# Patient Record
Sex: MALE | Race: OTHER | URBAN - METROPOLITAN AREA
[De-identification: names, ages, dates, MRNs, and addresses within clinical notes are randomized per-mention and may not be internally consistent; named-entity substitution may affect disease eponyms.]

---

## 2018-11-28 ENCOUNTER — OFFICE VISIT (OUTPATIENT)
Dept: FAMILY MEDICINE CLINIC | Age: 33
End: 2018-11-28

## 2018-11-28 VITALS
HEART RATE: 66 BPM | DIASTOLIC BLOOD PRESSURE: 70 MMHG | SYSTOLIC BLOOD PRESSURE: 135 MMHG | TEMPERATURE: 98.2 F | WEIGHT: 315 LBS | HEIGHT: 66 IN | BODY MASS INDEX: 50.62 KG/M2

## 2018-11-28 DIAGNOSIS — E66.01 MORBID OBESITY (HCC): ICD-10-CM

## 2018-11-28 DIAGNOSIS — Z23 ENCOUNTER FOR IMMUNIZATION: ICD-10-CM

## 2018-11-28 DIAGNOSIS — M25.552 LEFT HIP PAIN: Primary | ICD-10-CM

## 2018-11-28 RX ORDER — DICLOFENAC SODIUM 75 MG/1
75 TABLET, DELAYED RELEASE ORAL 2 TIMES DAILY
Qty: 60 TAB | Refills: 6 | Status: SHIPPED | OUTPATIENT
Start: 2018-11-28 | End: 2022-02-22

## 2018-11-28 NOTE — PROGRESS NOTES
Requests flu vaccine. Denies fever and egg allergy. VIS information sheet given to patient. Explained possible s/e. Reviewed s/sx indicating need to be seen in ER. Patient had no adverse reaction at time of discharge. Entered into VIIS. Vaccine(s) given per protocol and schedule by CORY Gutierrez. Enter into CC by Colonel Yadi LPN.

## 2018-11-28 NOTE — PROGRESS NOTES
HISTORY OF PRESENT ILLNESS Sam Rodriguez is a 26 y.o. male. HPI Is having left hip pain,  It has started 2 months ago and is on and off. Unaware of how it started. States that the cold makes it worse. Has taken some advil for the pain. Today is not having the pain. If he drives for a long time he gets the pain Review of Systems Constitutional: Negative for chills and fever. Respiratory: Negative for cough, shortness of breath and wheezing. Cardiovascular: Negative for chest pain and palpitations. Gastrointestinal: Negative for abdominal pain, heartburn, nausea and vomiting. Genitourinary: Negative for dysuria. Musculoskeletal: Positive for joint pain. Left hip pain Psychiatric/Behavioral: Negative for depression. /70 (BP 1 Location: Right arm, BP Patient Position: Sitting)   Pulse 66   Temp 98.2 °F (36.8 °C) (Oral)   Ht 5' 5.95\" (1.675 m)   Wt 316 lb (143.3 kg)   BMI 51.09 kg/m² Physical Exam  
Constitutional: He is oriented to person, place, and time. He appears well-developed and well-nourished. HENT:  
Head: Normocephalic. Right Ear: External ear normal.  
Left Ear: External ear normal.  
Eyes: Conjunctivae and EOM are normal. Pupils are equal, round, and reactive to light. Neck: Normal range of motion. Neck supple. No thyromegaly present. Cardiovascular: Normal rate, regular rhythm and normal heart sounds. No murmur heard. Pulmonary/Chest: Effort normal and breath sounds normal. No respiratory distress. He has no wheezes. He has no rales. Abdominal: Soft. Bowel sounds are normal. He exhibits no distension. Musculoskeletal: Normal range of motion. He exhibits no edema, tenderness or deformity. Neurological: He is alert and oriented to person, place, and time. He has normal reflexes. Skin: Skin is warm. No erythema. ASSESSMENT and PLAN Diagnoses and all orders for this visit: 1. Left hip pain -     diclofenac EC (VOLTAREN) 75 mg EC tablet; Take 1 Tab by mouth two (2) times a day. -     XR HIP LT W OR WO PELV 2-3 VWS; Future 2. Morbid obesity ([address] Left hip pain,  On and off,  Patient has gained over 50 pounds in the past 2 years. This is likely aggravating the hip pain. Will order x rays and nsaid. Exercises discussed. Weight loss encouraged

## 2018-11-28 NOTE — PATIENT INSTRUCTIONS
Dolor de cadera: Instrucciones de cuidado - [ Hip Pain: Care Instructions ] Instrucciones de cuidado El dolor de cadera podría tener muchas causas, incluidas el uso [place] caída o un movimiento de giro. La artritis es otra causa del dolor de cadera. El dolor podría aumentar cuando se pone de pie, camina o se pone en cuclillas. El dolor podría aparecer y desaparecer o ser isela. El tratamiento en el [facility] puede ayudar a aliviar el dolor, la hinchazón y la rigidez. Si el dolor continúa, selina vez necesite más pruebas y [place]. La atención de seguimiento es gillian parte clave de quesada tratamiento y seguridad. Asegúrese de hacer y acudir a todas las citas, y llame a quesada médico si está teniendo problemas. También es gillian buena idea saber los resultados de tariq exámenes y mantener gillian lista de los medicamentos que michael. Cómo puede cuidarse en el [facility]? · [employer] analgésicos (medicamentos para el dolor) exactamente jonel le fueron indicados. ? Si el médico le recetó un analgésico, tómelo según las indicaciones. ? Si no está tomando un analgésico recetado, pregúntele a quesada médico si puede iveth un medicamento de [employer]. · Descanse y proteja la cadera. Deje de hacer cualquier actividad, incluyendo estar de pie o caminar, que podría causarle dolor. · Aplíquese hielo o gillian compresa fría en la cadera enid 10 a 20 minutos cada vez. Trate de hacerlo cada 1 a 2 horas enid los siguientes 3 días (cuando esté despierto) o hasta que la hinchazón baje. Póngase un paño naylor entre el hielo y la piel. · Duerma del lado lauro con gillian almohada entre las rodillas, o duerma boca arriba con almohadas debajo de las rodillas. · Si no tiene hinchazón, puede aplicar calor húmedo, gillian almohadilla térmica o un paño tibio sobre la cadera. Ghassan ejercicios suaves de estiramiento para ayudar a mantener la cadera flexible. · Aprenda cómo prevenir las caídas.  Hágase revisiones regulares de la vista y los oídos. Use pantuflas o zapatos con suela antideslizante. · Mantenga un peso saludable. · Use zapatos cómodos. Cuándo debe pedir ayuda? Llame al 911 en cualquier momento que considere que necesita atención de emergencia. Por ejemplo, llame si: 
  · Tiene dolor repentino en el pecho y falta de aire, o tose malathi.  
  · No puede ponerse de pie, caminar o apoyar el peso del cuerpo.  
  · Siente entumecimiento u hormigueo en los glúteos (nalgas), las piernas o los pies.  
  · La pierna o el pie está frío o pálido o cambia de color.  
  · Tiene dolor intenso.  
 Llame a quesada médico ahora mismo o busque atención médica inmediata si: 
  · Tiene señales de infección, tales jonel: ? Mayor dolor, hinchazón, enrojecimiento o aumento de la temperatura en la tl de la cadera. ? Vetas rojizas que comienzan en la tl de la cadera. ? Pus que supura de la tl de la cadera. ? Arlander Og.  
  · Tiene señales de un coágulo de malathi, tales jonel: ? Dolor en la pantorrilla, el muslo, la adrian o detrás de la rodilla. ? Enrojecimiento e hinchazón en la pierna o la adrian.  
  · No puede flexionar, estirar o  la pierna de manera normal.  
  · Tiene problemas para orinar o evacuar el intestino.  
 Preste especial atención a los cambios en quesada sophie y asegúrese de comunicarse con quesada médico si: 
  · No mejora jonel se esperaba. Dónde puede encontrar más información en inglés? Vianca Stade a http://dana.info/. Kim Ruiz E029 en la búsqueda para aprender más acerca de \"Dolor de cadera: Instrucciones de cuidado - [ Hip Pain: Care Instructions ]. \" 
Revisado: 20 noviembre, 2017 Versión del contenido: 11.8 © 3478-4815 Healthwise, [employer]. Las instrucciones de cuidado fueron adaptadas bajo licencia por [employer] (which disclaims liability or warranty for this information).  Si usted tiene [place] afección médica o sobre estas instrucciones, siempre pregunte a quesada profesional de sophie. [facility] niega toda garantía o responsabilidad por quesada uso de esta información.

## 2018-11-28 NOTE — PROGRESS NOTES
Reviewed AVS, prescription and pharmacy location with patient. AVS and goodrx coupon printed and given. Walking x-ray process explained and address where to get x-ray given. Care card/financial assistance process explained. Instructed patient that after he gets the x-ray the provider will be able to see the result of the image and if abnormal he will receive a phone call with the result. This has been fully explained to the patient, who indicates understanding and agrees with plan. No further questions at this time.  Austin Chu RN

## 2019-09-25 ENCOUNTER — OFFICE VISIT (OUTPATIENT)
Dept: FAMILY MEDICINE CLINIC | Age: 34
End: 2019-09-25

## 2019-09-25 ENCOUNTER — HOSPITAL ENCOUNTER (OUTPATIENT)
Dept: LAB | Age: 34
Discharge: HOME | End: 2019-09-25

## 2019-09-25 VITALS
BODY MASS INDEX: 49.44 KG/M2 | SYSTOLIC BLOOD PRESSURE: 135 MMHG | HEART RATE: 66 BPM | OXYGEN SATURATION: 100 % | TEMPERATURE: 97.7 F | WEIGHT: 315 LBS | DIASTOLIC BLOOD PRESSURE: 79 MMHG | HEIGHT: 67 IN

## 2019-09-25 DIAGNOSIS — G47.30 SLEEP APNEA, UNSPECIFIED TYPE: Primary | ICD-10-CM

## 2019-09-25 DIAGNOSIS — E66.01 MORBID OBESITY (HCC): ICD-10-CM

## 2019-09-25 LAB
BASOPHILS # BLD: 0.1 K/UL (ref 0–0.1)
BASOPHILS NFR BLD: 1 % (ref 0–1)
DIFFERENTIAL METHOD BLD: ABNORMAL
EOSINOPHIL # BLD: 0.3 K/UL (ref 0–0.4)
EOSINOPHIL NFR BLD: 2 % (ref 0–7)
ERYTHROCYTE [DISTWIDTH] IN BLOOD BY AUTOMATED COUNT: 15.2 % (ref 11.5–14.5)
EST. AVERAGE GLUCOSE BLD GHB EST-MCNC: 120 MG/DL
GLUCOSE POC: NORMAL MG/DL
HBA1C MFR BLD: 5.8 % (ref 4.2–6.3)
HCT VFR BLD AUTO: 51 % (ref 36.6–50.3)
HGB BLD-MCNC: 15.9 G/DL (ref 12.1–17)
IMM GRANULOCYTES # BLD AUTO: 0 K/UL (ref 0–0.04)
IMM GRANULOCYTES NFR BLD AUTO: 0 % (ref 0–0.5)
LYMPHOCYTES # BLD: 3.4 K/UL (ref 0.8–3.5)
LYMPHOCYTES NFR BLD: 26 % (ref 12–49)
MCH RBC QN AUTO: 26.1 PG (ref 26–34)
MCHC RBC AUTO-ENTMCNC: 31.2 G/DL (ref 30–36.5)
MCV RBC AUTO: 83.6 FL (ref 80–99)
MONOCYTES # BLD: 0.8 K/UL (ref 0–1)
MONOCYTES NFR BLD: 6 % (ref 5–13)
NEUTS SEG # BLD: 8.6 K/UL (ref 1.8–8)
NEUTS SEG NFR BLD: 65 % (ref 32–75)
NRBC # BLD: 0 K/UL (ref 0–0.01)
NRBC BLD-RTO: 0 PER 100 WBC
PLATELET # BLD AUTO: 332 K/UL (ref 150–400)
PMV BLD AUTO: 9.5 FL (ref 8.9–12.9)
RBC # BLD AUTO: 6.1 M/UL (ref 4.1–5.7)
TSH SERPL DL<=0.05 MIU/L-ACNC: 2.01 UIU/ML (ref 0.36–3.74)
WBC # BLD AUTO: 13.3 K/UL (ref 4.1–11.1)

## 2019-09-25 PROCEDURE — 80061 LIPID PANEL: CPT

## 2019-09-25 PROCEDURE — 83036 HEMOGLOBIN GLYCOSYLATED A1C: CPT

## 2019-09-25 PROCEDURE — 85025 COMPLETE CBC W/AUTO DIFF WBC: CPT

## 2019-09-25 PROCEDURE — 84443 ASSAY THYROID STIM HORMONE: CPT

## 2019-09-25 NOTE — PROGRESS NOTES
At discharge station AVS was printed and reviewed with pt who speaks [place]. Pt scheduled for sleep consult on Monday at [facility] location at 10:30. Told pt to ask about the care card which is our financial assistance program.  Also told pt that they will be required to do a financial screening  Also told them that if they get a bill before they get their card to call the phone # on the bill to let them know they have applied for the Care Card. Gave pt financial assistance application and highlighted for them the [employer] assistance phone number for them as well.  Pt taken to registration to make return appointments as directed by provider today for provider follow up and nutrition referral. Alonzo Luna RN

## 2019-09-25 NOTE — PROGRESS NOTES
Results for orders placed or performed in visit on 09/25/19   AMB POC GLUCOSE BLOOD, BY GLUCOSE MONITORING DEVICE   Result Value Ref Range    Glucose POC F 75 mg/dL     Coordination of Care  1. Have you been to the ER, urgent care clinic since your last visit? Hospitalized since your last visit? No    2. Have you seen or consulted any other health care providers outside of the [address] [place] [address] since your last visit? Include any pap smears or colon screening. No    Does the patient need refills? NO    Learning Assessment Complete?  yes  Depression Screening complete in the past 12 months? yes

## 2019-09-25 NOTE — PROGRESS NOTES
Assessment/Plan:       ICD-10-CM ICD-9-CM    1. Sleep apnea, unspecified type G47.30 780.57 SLEEP MEDICINE REFERRAL   2. Morbid obesity ([facility] [address] E66.01 278.01 AMB POC GLUCOSE BLOOD, BY GLUCOSE MONITORING DEVICE      TSH 3RD GENERATION      CBC WITH AUTOMATED DIFF      HEMOGLOBIN A1C WITH EAG      LIPID PANEL         [address], [address] [place] [address] Avenue  [facility] Tomma Band [address]  [place] [address]  Phone: [phone] Fax: [phone]      [address]  Subjective:     Chief Complaint   Patient presents with    Other     Tired, weakness    Cat Moncada is a 37 y.o. OTHER male. HPI: Weight gain is noted. Works 6 days a day, 10 hours a day. He is tired at night. He snores and stops breathing. He  has no past medical history on file. Review of Systems: Negative for: fever, chest pain, shortness of breath, leg swelling, exertional dyspnea, palpitations. Current Medications:   Current Outpatient Medications on File Prior to Visit   Medication Sig    diclofenac EC (VOLTAREN) 75 mg EC tablet Take 1 Tab by mouth two (2) times a day. No current facility-administered medications on file prior to visit. Social History: He  reports that he has never smoked. He has never used smokeless tobacco. He reports that he does not drink alcohol or use drugs. Objective:     Vitals:    09/25/19 1232   BP: 135/79   Pulse: 66   Temp: 97.7 °F (36.5 °C)   TempSrc: Oral   SpO2: 100%   Weight: 347 lb (157.4 kg)   Height: 5' 7.13\" (1.705 m)    No LMP for male patient. Wt Readings from Last 2 Encounters:   09/25/19 347 lb (157.4 kg)   11/28/18 316 lb (143.3 kg)     Results for orders placed or performed in visit on 09/25/19   AMB POC GLUCOSE BLOOD, BY GLUCOSE MONITORING DEVICE   Result Value Ref Range    Glucose POC F 75 mg/dL      Physical Examination:  General appearance - well developed, no acute distress. Chest - clear to auscultation.   Heart - regular rate and rhythm without murmurs, rubs, or gallops. Abdomen - bowel sounds present x 4, NT, ND. Extremities - pulses intact. No peripheral edema. Assessment/Plan:   Diagnoses and all orders for this visit:    1. Sleep apnea, unspecified type  -     SLEEP MEDICINE REFERRAL    2. Morbid obesity ([address]  -     AMB POC GLUCOSE BLOOD, BY GLUCOSE MONITORING DEVICE  -     TSH 3RD GENERATION; Future  -     CBC WITH AUTOMATED DIFF; Future  -     HEMOGLOBIN A1C WITH EAG; Future  -     LIPID PANEL; Future    Hira Alvarez DNP, FNP-BC, BC-ADM  Diane Hernandez expressed understanding of this plan.

## 2019-09-26 LAB
CHOLEST SERPL-MCNC: 185 MG/DL
HDLC SERPL-MCNC: 42 MG/DL
HDLC SERPL: 4.4 {RATIO} (ref 0–5)
LDLC SERPL CALC-MCNC: 120.4 MG/DL (ref 0–100)
LIPID PROFILE,FLP: ABNORMAL
TRIGL SERPL-MCNC: 113 MG/DL (ref ?–150)
VLDLC SERPL CALC-MCNC: 22.6 MG/DL

## 2019-10-02 NOTE — PROGRESS NOTES
Vitamin D is extremely low. Treatment is once a week vitamin D for 8 weeks followed by a lower dose every day for a total of 1 year of treatment. I have sent in both prescriptions. Other labs normal/negative.

## 2019-10-29 NOTE — PROGRESS NOTES
I did go ahead and mail pt labs with message to call the office. I LM for him today to call the office to speak with a nurse. I did this before I had seen message from [facility] earlier today.  John Meyer RN

## 2019-10-29 NOTE — PROGRESS NOTES
After reviewing chart, the rx is needed before tc to the pt should be made. Message sent to provider to please review and place an order if needed.  India Mancini RN

## 2019-10-30 NOTE — PROGRESS NOTES
Chart review indicates Vitamin D level was not one of the labs for this pt. Per EWA Portillo, disregard her lab result message regarding Vitamin D. Letter with normal lab results has been sent to the pt.   Tricia Rodriguez RN

## 2019-11-14 ENCOUNTER — OFFICE VISIT (OUTPATIENT)
Dept: FAMILY MEDICINE CLINIC | Age: 34
End: 2019-11-14

## 2019-11-14 DIAGNOSIS — Z71.3 DIETARY COUNSELING AND SURVEILLANCE: Primary | ICD-10-CM

## 2019-11-14 NOTE — PROGRESS NOTES
DATE: 2019      REFERRING PHYSICIAN: Celeste Garcia  NAME: Slava Hernandez : 1985 AGE: 37 y.o. GENDER: male  REASON FOR VISIT: morbid obesity    ASSESSMENT:  Past Medical Hx: NA      LABS:   Lab Results   Component Value Date/Time    Hemoglobin A1c 5.8 2019 01:53 PM       FOOD ALLERGIES/INTOLERANCES: NA    ANTHROPOMETRICS:    Ht Readings from Last 1 Encounters:   19 5' 7.13\" (1.705 m)      Wt Readings from Last 1 Encounters:   19 347 lb (157.4 kg)   19          344 lbs        Reported Wt Hx: Lost 3 lbs     24 Hour Diet Recall  Breakfast  Sweet bread   Lunch  [place], subway  soda   Dinner  Eggs, beans and tortilla (6)   Snacks     Beverages  coffee     Exercise/Physical Actvity:    None        Environmental/Social:    Working 60 hours/day            NUTRITION INTERVENTION:  Introduced MyPlate and discussed how it can help to ensure both balance and variety. Reviewed the food groups and what each group contributes to our bodies. Using food models, RD demonstrated appropriate portion sizes. Agrees to play 10 minutes basketball/soccer with son  Agrees to eliminate all added sugars          PATIENT GOALS:      Specific tips and techniques to facilitate compliance with above recommendations were provided and discussed. Pt was strongly encouraged to begin making necessary changes now, and re-visit the dietitian tamica.             Pamela Mack RD

## 2022-02-16 ENCOUNTER — OFFICE VISIT (OUTPATIENT)
Dept: FAMILY MEDICINE CLINIC | Age: 37
End: 2022-02-16

## 2022-02-16 ENCOUNTER — HOSPITAL ENCOUNTER (OUTPATIENT)
Dept: LAB | Age: 37
Discharge: HOME | End: 2022-02-16

## 2022-02-16 VITALS
BODY MASS INDEX: 49.44 KG/M2 | WEIGHT: 315 LBS | DIASTOLIC BLOOD PRESSURE: 70 MMHG | HEIGHT: 67 IN | HEART RATE: 71 BPM | TEMPERATURE: 96.9 F | SYSTOLIC BLOOD PRESSURE: 129 MMHG

## 2022-02-16 DIAGNOSIS — E66.01 MORBID OBESITY (HCC): ICD-10-CM

## 2022-02-16 DIAGNOSIS — Z23 ENCOUNTER FOR IMMUNIZATION: Primary | ICD-10-CM

## 2022-02-16 PROCEDURE — 80061 LIPID PANEL: CPT

## 2022-02-16 PROCEDURE — 80053 COMPREHEN METABOLIC PANEL: CPT

## 2022-02-16 PROCEDURE — 84443 ASSAY THYROID STIM HORMONE: CPT

## 2022-02-16 PROCEDURE — 83036 HEMOGLOBIN GLYCOSYLATED A1C: CPT

## 2022-02-16 PROCEDURE — 99213 OFFICE O/P EST LOW 20 MIN: CPT | Performed by: PHYSICIAN ASSISTANT

## 2022-02-16 NOTE — PROGRESS NOTES
Patient stated he will schedule appointment for flu vaccine. An After Visit Summary was printed and reviewed with the patient. Informed patient to give name and date of birth when picking up medication. Patient verbalized understanding.   Antonette Samuels

## 2022-02-16 NOTE — PROGRESS NOTES
Assessment/Plan:    Diagnoses and all orders for this visit:    1. Encounter for immunization    2. Morbid obesity ([facility] [address]  -     LIPID PANEL; Future  -     METABOLIC PANEL, COMPREHENSIVE; Future  -     HEMOGLOBIN A1C WITH EAG; Future  -     TSH 3RD GENERATION; Future        Follow-up and Dispositions    · Return in about 1 week (around 2/23/2022) for vv with me . NITA Sellers expressed understanding of this plan. An AVS was printed and given to the patient.      ----------------------------------------------------------------------    Chief Complaint   Patient presents with    Annual Wellness Visit       History of Present Illness:    New pt to me, I have reviewed his chart before the visit today. He presents today stating that he feels well and has no complaints but would like to have a physical  He specifically denies chest pain, SOB, PAGE, extremity swelling, blurry vision, polyuria/polydipsia, weight changes  He came fasting today for the visit. He usually has donut and coffee for breakfast, lunch at a restaurant and then wife makes dinner  He felt that the dietician counseling was helpful in the past. He has gained more weight since that visit  He is a non smoker and non drinker  We discussed the risks of being obese. He should start by eating more vegetables and lean meats. 2 fruits a day. He is in a very high BMI category  His wife is still mentioning the noises that he makes when he is sleeping but he states that he feels well rested in the morning and doesn't have any problems  He states that he went to sleep doctor appt before (not in his chart) and was told that he did not need a machine. Will try to get the results     No past medical history on file. Current Outpatient Medications   Medication Sig Dispense Refill    diclofenac EC (VOLTAREN) 75 mg EC tablet Take 1 Tab by mouth two (2) times a day.  60 Tab 6       No Known Allergies    Social History Tobacco Use    Smoking status: Never Smoker    Smokeless tobacco: Never Used   Substance Use Topics    Alcohol use: No    Drug use: No       No family history on file.     Physical Exam:     Visit Vitals  /70 (BP 1 Location: Left upper arm)   Pulse 71   Temp 96.9 °F (36.1 °C) (Oral)   Ht 5' 6.73\" (1.695 m)   Wt 350 lb (158.8 kg)   BMI 55.26 kg/m²     pleasant  A&Ox3  WDWN NAD  Respirations normal and non labored  Neck- no thyroid enlargement  Lungs CTA harsha  Cor RRR s1s2  HEENT- TM clear, nares boggy turbinates, OP clear  abd- obese

## 2022-02-16 NOTE — PATIENT INSTRUCTIONS
Cuando tiene sobrepeso: Instrucciones de cuidado  When You Are Overweight: Care Instructions  Instrucciones de cuidado    Si tiene sobrepeso, es posible que quesada médico le recomiende que liseth cambios en tariq hábitos alimenticios y de ejercicio. Tener sobrepeso puede causar problemas de sophie graves, tales jonel presión arterial raymundo, enfermedades cardíacas, diabetes tipo 2 y artritis, o puede agravar estos problemas. [place] gillian dieta saludable y ser más activo puede ayudarle a alcanzar y mantener un peso saludable. No tiene que [employer] cambios a la vez. Comience por hacer pequeños cambios en tariq hábitos alimenticios y de ejercicio. Para bajar de peso, necesita quemar más calorías de las que ingiere. Puede hacerlo comiendo alimentos saludables en cantidades razonables y volviéndose más activo todos los GRASSE. La atención de seguimiento es gillian parte clave de quesada tratamiento y seguridad. Asegúrese de hacer y acudir a todas las citas, y llame a quesada médico si está teniendo problemas. También es gillian buena idea saber los resultados de tariq exámenes y mantener gillian lista de los medicamentos que michael. ¿Cómo puede cuidarse en el hogar? · Mejore tariq hábitos alimentarios. Usted tendrá más éxito si trabaja en el cambio de un hábito alimenticio a la vez. Todos los alimentos, si se comen con [place], pueden ser parte de gillian alimentación saludable. Recuerde:  ? Coma gillian variedad de alimentos de cada tash de alimentos. Incluya granos, verduras, frutas, lácteos y alimentos ricos en proteínas. ? Limite los alimentos altos en grasas, azúcares y calorías. ? Coma despacio. Y no liseth otra cosa, jonel shayne televisión, mientras esté comiendo. ? [address] porciones. Ponga quesada comida en un plato más pequeño. ? Planifique tariq comidas con anticipación. Tendrá menos probabilidades de coger algo que no es tan saludable. · Manténgase activo.  La actividad regular puede ayudarle a sentirse evanr[employer]ing [employer] y quemar más calorías. Si usted no ha sido activo, comience despacio. Comience con al menos 30 minutos de [place] moderada la mayoría de los días de la [place]. Luego aumente gradualmente la cantidad [facility]. Trate de hacer 60 a 90 minutos por día, por lo menos 5 días a la semana. Hay muchas maneras de [employer] en quesada cristal. Usted puede:  ? Ir a la erna caminando o en bicicleta. O caminar con un amigo, o pasear al aruna. ? Cortar el césped, recoger las hojas, palear la shaan o hacer algo de [place]. ? Usar las escaleras en lugar del elevador, al menos para algunos pisos. · Cambie quesada manera de pensar. Jennifer pensamientos tienen mucho que shayne con cómo se siente y lo que hace. Cuando usted está tratando de alcanzar un peso saludable, cambiar quesada forma de pensar sobre ciertas cosas puede ayudar. Aquí tiene algunos consejos:  ? No se compare con los demás. Hay cuerpos saludables de todas las formas y de [place]. ? Preste atención a cuánta hambre tiene o cuán satisfecho se siente. Cuando coma, sea consciente de por qué está comiendo y de cuánto está comiendo. ? Enfóquese en mejorar quesada sophie en vez de hacer dietas. Hacer dietas ainsley nunca da resultado a amairani plazo. · Pregúntele a quesada médico sobre otros profesionales de la sophie que puedan ayudarle a alcanzar un peso saludable. ? Un dietista puede ayudarle a hacer cambios saludables en quesada dieta. ? Un especialista en ejercicio o entrenador personal pueden ayudarle a desarrollar un programa de ejercicio [place] y seguro. ? Un consejero o psiquiatra puede ayudarle a sobrellevar la depresión, la ansiedad o problemas familiares que puedan dificultar concentrarse en alcanzar un peso saludable. · Obtenga apoyo de quesada samy, quesada médico, jennifer amigos, un tash de apoyo; y apóyese usted mismo. ¿Dónde puede encontrar más información en inglés?   Thyra Dux a http://www.gray.com/  [place] [id number] en la búsqueda para aprender más acerca de \"Cuando tiene sobrepeso: Instrucciones de cuidado. \"  Revisado: 17 marzo, 2021               Versión del contenido: 13.0  © 8569-3881 Healthwise, Incorporated. Las instrucciones de cuidado fueron adaptadas bajo licencia por Good Help [employer]ctions (which disclaims liability or warranty for this information). Si usted tiene [place] afección médica o sobre estas instrucciones, siempre pregunte a quesada profesional de sophie. [employer] niega toda garantía o responsabilidad por quesada uso de esta información.

## 2022-02-16 NOTE — PROGRESS NOTES
Coordination of Care  1. Have you been to the ER, urgent care clinic since your last visit? Hospitalized since your last visit? No    2. Have you seen or consulted any other health care providers outside of the [address] [place] [address] since your last visit? Include any pap smears or colon screening. No    Does the patient need refills? NO    Learning Assessment Complete?  yes  Depression Screening complete in the past 12 months? yes

## 2022-02-17 LAB
ALBUMIN SERPL-MCNC: 3.8 G/DL (ref 3.5–5)
ALBUMIN/GLOB SERPL: 1.1 {RATIO} (ref 1.1–2.2)
ALP SERPL-CCNC: 82 U/L (ref 45–117)
ALT SERPL-CCNC: 60 U/L (ref 12–78)
ANION GAP SERPL CALC-SCNC: 4 MMOL/L (ref 5–15)
AST SERPL-CCNC: 24 U/L (ref 15–37)
BILIRUB SERPL-MCNC: 0.6 MG/DL (ref 0.2–1)
BUN SERPL-MCNC: 11 MG/DL (ref 6–20)
BUN/CREAT SERPL: 15 (ref 12–20)
CALCIUM SERPL-MCNC: 9.4 MG/DL (ref 8.5–10.1)
CHLORIDE SERPL-SCNC: 106 MMOL/L (ref 97–108)
CHOLEST SERPL-MCNC: 138 MG/DL
CO2 SERPL-SCNC: 28 MMOL/L (ref 21–32)
CREAT SERPL-MCNC: 0.74 MG/DL (ref 0.7–1.3)
EST. AVERAGE GLUCOSE BLD GHB EST-MCNC: 120 MG/DL
GLOBULIN SER CALC-MCNC: 3.4 G/DL (ref 2–4)
GLUCOSE SERPL-MCNC: 98 MG/DL (ref 65–100)
HBA1C MFR BLD: 5.8 % (ref 4–5.6)
HDLC SERPL-MCNC: 36 MG/DL
HDLC SERPL: 3.8 {RATIO} (ref 0–5)
LDLC SERPL CALC-MCNC: 84 MG/DL (ref 0–100)
POTASSIUM SERPL-SCNC: 4.5 MMOL/L (ref 3.5–5.1)
PROT SERPL-MCNC: 7.2 G/DL (ref 6.4–8.2)
SODIUM SERPL-SCNC: 138 MMOL/L (ref 136–145)
TRIGL SERPL-MCNC: 90 MG/DL (ref ?–150)
TSH SERPL DL<=0.05 MIU/L-ACNC: 1.71 UIU/ML (ref 0.36–3.74)
VLDLC SERPL CALC-MCNC: 18 MG/DL

## 2022-02-22 ENCOUNTER — VIRTUAL VISIT (OUTPATIENT)
Dept: FAMILY MEDICINE CLINIC | Age: 37
End: 2022-02-22

## 2022-02-22 DIAGNOSIS — R73.03 PRE-DIABETES: Primary | ICD-10-CM

## 2022-02-22 PROCEDURE — 99442 PR PHYS/QHP TELEPHONE EVALUATION 11-20 MIN: CPT | Performed by: PHYSICIAN ASSISTANT

## 2022-02-22 NOTE — PROGRESS NOTES
Discharge instructions; Check-out Note: Can you send him good info on pre diabetes in Slovak? I will have to do this when a printer is available. I sent a staff message as a reminder.  Maylin Kaur RN

## 2022-02-22 NOTE — PROGRESS NOTES
Intake with  Stephenie Peterson. Tc 3x. Vs equipment is not available. Coordination of Care  1. Have you been to the ER, urgent care clinic since your last visit? Hospitalized since your last visit? No    2. Have you seen or consulted any other health care providers outside of the [address] [place] [address] since your last visit? Include any pap smears or colon screening. No    Does the patient need refills?  N/A    Learning Assessment Complete? no  Depression Screening complete in the past 12 months? yes

## 2022-02-22 NOTE — PROGRESS NOTES
Assessment/Plan:    Diagnoses and all orders for this visit:    1. Pre-diabetes    Plan: start lifestyle changes. Bring back in 3 months for recheck weight and blood sugar  He agrees to this plan     Follow-up and Dispositions    · Return in about 3 months (around 2022) for 3 month f2f with me please . NITA Zhao expressed understanding of this plan. An AVS was printed and given to the patient.      ----------------------------------------------------------------------    Chief Complaint   Patient presents with    Results     follow up       History of Present Illness:    Virtual visit, pt consented to the visit and declined video  He was identified by name and     He is being seen today for results visit from labs last week  He has normal xol, cmp and tsh. a1c shows pre diabetes  We discussed the results. We discussed lifestyle changes that could result in a 15 lb weight loss  I offered to follow him q 3 months if he would like in order to make sure we are moving in the right direction and he would like that  Offered for him to bring his wife in too so that she can learn as much as possible about health lifestyle to prevent progression into diabetes     No past medical history on file. No Known Allergies    Social History     Tobacco Use    Smoking status: Never Smoker    Smokeless tobacco: Never Used   Substance Use Topics    Alcohol use: No    Drug use: No       No family history on file. Physical Exam:     There were no vitals taken for this visit.     A&Ox3  WDWN NAD  Respirations normal and non labored

## 2022-02-22 NOTE — PATIENT INSTRUCTIONS
Prediabetes: Instrucciones de cuidado  Prediabetes: Care Instructions  Instrucciones de cuidado  La prediabetes es gillian señal de advertencia de que usted está en riesgo de llegar a tener diabetes tipo 2. Ord significa que quesada nivel de azúcar en la malathi es más alto de lo que debiera ser. Los alimentos que usted come se convierten en azúcar, la cual quesada cuerpo [place] para obtener energía. Normalmente, un órgano llamado páncreas produce insulina, la cual permite que el azúcar en la malathi llegue a las células del cuerpo. Chidi cuando el cuerpo no puede utilizar la [employer], el azúcar no entra en las células. En cambio, se queda en [place] [employer]. Ord se conoce jonel resistencia a la insulina. La acumulación de azúcar en la malathi causa prediabetes. Lo butler es que hacer cambios en quesada estilo de cristal puede ayudarle a hacer que el azúcar en la malathi vuelva a un nivel normal y puede ayudarle a evitar o retrasar la diabetes. La atención de seguimiento es gillian parte clave de quesada tratamiento y seguridad. Asegúrese de hacer y acudir a todas las citas, y llame a quesada médico si está teniendo problemas. También es gillian buena idea saber los resultados de tariq exámenes y mantener gillian lista de los medicamentos que michael. ¿Cómo puede cuidarse en el hogar? · Vigile quesada peso. Un peso saludable ayuda al organismo a usar la insulina de la [place]. · Limite la cantidad de calorías, dulces y grasas poco saludables que come. Pregunte a quesada médico si debería consultar a un dietista. Un dietista registrado puede ayudarle a elaborar planes de alimentación que se adapten a quesada estilo de cristal. · Ghassan por lo menos 30 minutos de ejercicio la mayoría de los días de la [place]. El ejercicio ayuda a controlar el azúcar en quesada malathi. También ayuda a mantener un peso saludable. Caminar es gillian buena opción.  Es posible que también quiera hacer otras actividades, ojnel correr, nadar, [employer], o jugar al tenis u otros deportes de equipo. · No fume. Fumar puede empeorar la prediabetes. Si necesita ayuda para dejar de fumar, hable con quesada médico sobre programas y medicamentos para dejar de fumar. Estos pueden aumentar tariq probabilidades de dejar el hábito para siempre. · Si quesada médico le recetó medicamentos, tómelos exactamente jonle se lo indicó. Llame a quesada médico si checo estar teniendo problemas con quesada medicamento. Recibirá [employer] medicamentos específicos recetados por quesada médico.  ¿Cuándo debe pedir ayuda? Preste especial atención a los cambios en quesada sophie y asegúrese de comunicarse con quesada médico si:    · Tiene cualquier síntoma de diabetes. Estos síntomas podrían incluir:  ? Tener sed con más frecuencia. ? [employer]. ? [employer]. ? [employer]. ? Sentirse muy cansado. ? Tener visión borrosa.     · Tiene gillian herida que no cicatriza.     · Tiene gillian infección que no desaparece.     · Tiene problemas con quesada presión arterial.     · Desea más información sobre la diabetes y cómo evitarla. ¿Dónde puede encontrar más información en inglés? Guicho Gruber a http://www.gray.com/  Escriba I222 en la búsqueda para aprender más acerca de \"Prediabetes: Instrucciones de cuidado. \"  Revisado: 31 agosto, 2020               Versión del contenido: 13.0  © 2006-2021 Healthwise, Incorporated. Las instrucciones de cuidado fueron adaptadas bajo licencia por [employer] (which disclaims liability or warranty for this information). Si usted tiene [place] afección médica o sobre estas instrucciones, siempre pregunte a quesada profesional de sophie. Healthwise, Incorporated niega toda garantía o responsabilidad por quesada uso de esta información.

## 2022-03-01 ENCOUNTER — TELEPHONE (OUTPATIENT)
Dept: FAMILY MEDICINE CLINIC | Age: 37
End: 2022-03-01

## 2022-03-18 PROBLEM — E66.01 OBESITY, MORBID (HCC): Status: ACTIVE | Noted: 2018-11-28

## 2022-06-13 ENCOUNTER — OFFICE VISIT (OUTPATIENT)
Dept: FAMILY MEDICINE CLINIC | Age: 37
End: 2022-06-13

## 2022-06-13 VITALS
TEMPERATURE: 97.5 F | HEART RATE: 66 BPM | WEIGHT: 315 LBS | OXYGEN SATURATION: 100 % | HEIGHT: 67 IN | DIASTOLIC BLOOD PRESSURE: 60 MMHG | SYSTOLIC BLOOD PRESSURE: 111 MMHG | BODY MASS INDEX: 49.44 KG/M2

## 2022-06-13 DIAGNOSIS — R73.03 PRE-DIABETES: Primary | ICD-10-CM

## 2022-06-13 DIAGNOSIS — E66.01 MORBID OBESITY (HCC): ICD-10-CM

## 2022-06-13 LAB — GLUCOSE POC: NORMAL MG/DL

## 2022-06-13 PROCEDURE — 99213 OFFICE O/P EST LOW 20 MIN: CPT | Performed by: PHYSICIAN ASSISTANT

## 2022-06-13 PROCEDURE — 82962 GLUCOSE BLOOD TEST: CPT | Performed by: PHYSICIAN ASSISTANT

## 2022-06-13 NOTE — PROGRESS NOTES
Coordination of Care  1. Have you been to the ER, urgent care clinic since your last visit? Hospitalized since your last visit? No    2. Have you seen or consulted any other health care providers outside of the [address] [place] [address] since your last visit? Include any pap smears or colon screening. No    Does the patient need refills? NO    Learning Assessment Complete?  yes  Depression Screening complete in the past 12 months? yes   Results for orders placed or performed in visit on 06/13/22   AMB POC GLUCOSE BLOOD, BY GLUCOSE MONITORING DEVICE   Result Value Ref Range    Glucose POC Nf-92 MG/DL

## 2022-06-13 NOTE — PROGRESS NOTES
Assessment/Plan:    Diagnoses and all orders for this visit:    1. Pre-diabetes  -     AMB POC GLUCOSE BLOOD, BY GLUCOSE MONITORING DEVICE    2. Morbid obesity ([address]    Pt has lost 5 pounds with modest dietary changes. We had a very long discussion about how his choices now will affect his future health. He has decided that he does NOT want medicine at this point but that he wants to continue making changes- the first is to stop sugary drinks he states  He was reminded that I am here to help him in any way     Follow-up and Dispositions    · Return in about 6 months (around 12/13/2022). NITA Solitario expressed understanding of this plan. An AVS was printed and given to the patient.      ----------------------------------------------------------------------    Chief Complaint   Patient presents with    Weight Management     and prediabetes f/up       History of Present Illness:    Pt presents for f/up on pre diabetes/ morbid obesity. He has made some changes like taking smaller portions of rice and cutting back on sweets  He does not like salads but likes broccoli and cauliflower  He is not exercising but is active  We went through the whole natural history of diabetes again, to include what organs are affected, and why I am more concerned about my young patients who are dx 'd with DM. .. He does not want medicine but understands that he will need to further make changes to his diet  We talked about why people overeat and ways to counter this too    No past medical history on file. No Known Allergies    Social History     Tobacco Use    Smoking status: Never Smoker    Smokeless tobacco: Never Used   Substance Use Topics    Alcohol use: No    Drug use: Never       No family history on file.     Physical Exam:     Visit Vitals  /60 (BP 1 Location: Left upper arm, BP Patient Position: Sitting)   Pulse 66   Temp 97.5 °F (36.4 °C) (Temporal)   Ht 5' 6.73\" (1.695 m)   Wt 345 lb (156.5 kg)   SpO2 100%   BMI 54.47 kg/m²       A&Ox3  WDWN NAD  Respirations normal and non labored

## 2022-06-13 NOTE — PROGRESS NOTES
I have printed AVS and reviewed it with patient today. Patient verbalized understanding. I instructed patient that he or she will receive a phone call to schedule a follow-up appointment. Patient verbalized understanding. Patient correctly stated his full name and date of birth prior to the information shared.  Blayne Vasquez with the [place] [id number] assisted with this discharge.  Margarita Barcenas RN

## 2023-08-01 ENCOUNTER — TELEMEDICINE (OUTPATIENT)
Age: 38
End: 2023-08-01

## 2023-08-01 DIAGNOSIS — R20.2 NUMBNESS AND TINGLING IN LEFT HAND: Primary | ICD-10-CM

## 2023-08-01 DIAGNOSIS — R20.0 NUMBNESS AND TINGLING IN LEFT HAND: Primary | ICD-10-CM

## 2023-08-01 DIAGNOSIS — L50.9 HIVES: ICD-10-CM

## 2023-08-01 PROCEDURE — 99442 PR PHYS/QHP TELEPHONE EVALUATION 11-20 MIN: CPT | Performed by: FAMILY MEDICINE

## 2023-08-01 SDOH — ECONOMIC STABILITY: HOUSING INSECURITY
IN THE LAST 12 MONTHS, WAS THERE A TIME WHEN YOU DID NOT HAVE A STEADY PLACE TO SLEEP OR SLEPT IN A SHELTER (INCLUDING NOW)?: NO

## 2023-08-01 SDOH — ECONOMIC STABILITY: FOOD INSECURITY: WITHIN THE PAST 12 MONTHS, YOU WORRIED THAT YOUR FOOD WOULD RUN OUT BEFORE YOU GOT MONEY TO BUY MORE.: SOMETIMES TRUE

## 2023-08-01 SDOH — ECONOMIC STABILITY: FOOD INSECURITY: WITHIN THE PAST 12 MONTHS, THE FOOD YOU BOUGHT JUST DIDN'T LAST AND YOU DIDN'T HAVE MONEY TO GET MORE.: NEVER TRUE

## 2023-08-01 SDOH — ECONOMIC STABILITY: INCOME INSECURITY: HOW HARD IS IT FOR YOU TO PAY FOR THE VERY BASICS LIKE FOOD, HOUSING, MEDICAL CARE, AND HEATING?: NOT HARD AT ALL

## 2023-08-01 ASSESSMENT — PATIENT HEALTH QUESTIONNAIRE - PHQ9
SUM OF ALL RESPONSES TO PHQ QUESTIONS 1-9: 0
2. FEELING DOWN, DEPRESSED OR HOPELESS: 0
SUM OF ALL RESPONSES TO PHQ9 QUESTIONS 1 & 2: 0
1. LITTLE INTEREST OR PLEASURE IN DOING THINGS: 0
SUM OF ALL RESPONSES TO PHQ QUESTIONS 1-9: 0

## 2023-08-01 NOTE — PROGRESS NOTES
Intake call to the pt. The pt verified his name and . Coordination of Care  1. Have you been to the ER, urgent care clinic since your last visit? Hospitalized since your last visit? No    2. Have you seen or consulted any other health care providers outside of the [address] [place] [address] since your last visit? Include any pap smears or colon screening. No  Does the patient need refills? No    Learning Assessment Complete? no  Depression Screening complete in the past 12 months? yes
Visit was conducted with patient's (and/or legal guardian's) consent. Patient identification was verified, and a caregiver was present when appropriate. The patient was located at Home: [address] [place] [address]  Provider was located at Home ([address] [place] [address]): [place]     Patient identification was verified at the start of the visit: yes    Services were provided through a phone synchronous discussion virtually to substitute for in-person clinic visit. Patient was located at home and provider was located in office or at home. An electronic signature was used to authenticate this note.   -- Gabby Webster MD

## 2023-08-30 ENCOUNTER — OFFICE VISIT (OUTPATIENT)
Age: 38
End: 2023-08-30

## 2023-08-30 VITALS
DIASTOLIC BLOOD PRESSURE: 83 MMHG | TEMPERATURE: 97.7 F | OXYGEN SATURATION: 98 % | HEART RATE: 71 BPM | SYSTOLIC BLOOD PRESSURE: 144 MMHG | WEIGHT: 315 LBS | RESPIRATION RATE: 20 BRPM | BODY MASS INDEX: 55.95 KG/M2

## 2023-08-30 DIAGNOSIS — R03.0 ELEVATED BLOOD-PRESSURE READING WITHOUT DIAGNOSIS OF HYPERTENSION: ICD-10-CM

## 2023-08-30 DIAGNOSIS — Z13.9 ENCOUNTER FOR SCREENING: Primary | ICD-10-CM

## 2023-08-30 DIAGNOSIS — E66.01 MORBID OBESITY (HCC): ICD-10-CM

## 2023-08-30 DIAGNOSIS — M25.561 CHRONIC PAIN OF BOTH KNEES: ICD-10-CM

## 2023-08-30 DIAGNOSIS — G89.29 CHRONIC PAIN OF BOTH KNEES: ICD-10-CM

## 2023-08-30 DIAGNOSIS — M25.562 CHRONIC PAIN OF BOTH KNEES: ICD-10-CM

## 2023-08-30 LAB — GLUCOSE, POC: 97 MG/DL

## 2023-08-30 PROCEDURE — 99213 OFFICE O/P EST LOW 20 MIN: CPT | Performed by: PHYSICIAN ASSISTANT

## 2023-08-30 PROCEDURE — 82962 GLUCOSE BLOOD TEST: CPT | Performed by: PHYSICIAN ASSISTANT

## 2023-08-30 ASSESSMENT — PATIENT HEALTH QUESTIONNAIRE - PHQ9
2. FEELING DOWN, DEPRESSED OR HOPELESS: 0
SUM OF ALL RESPONSES TO PHQ QUESTIONS 1-9: 0
1. LITTLE INTEREST OR PLEASURE IN DOING THINGS: 0
SUM OF ALL RESPONSES TO PHQ QUESTIONS 1-9: 0
SUM OF ALL RESPONSES TO PHQ9 QUESTIONS 1 & 2: 0
SUM OF ALL RESPONSES TO PHQ QUESTIONS 1-9: 0
SUM OF ALL RESPONSES TO PHQ QUESTIONS 1-9: 0

## 2023-08-30 NOTE — PROGRESS NOTES
Coordination of Care  1. Have you been to the ER, urgent care clinic since your last visit? NO  Hospitalized since your last visit? NO    2. Have you seen or consulted any other health care providers outside of the [address] [place] [address] since your last visit? Include any pap smears or colon screening. NO  Does the patient need refills?  N/A    Learning Assessment Complete? no  Depression Screening complete in the past 12 months? yes   Results for orders placed or performed in visit on 08/30/23   AMB POC GLUCOSE BLOOD, BY GLUCOSE MONITORING DEVICE   Result Value Ref Range    Glucose, POC 97 MG/DL    Not fasting

## 2023-08-30 NOTE — PROGRESS NOTES
Susan assisted in discharge. Name and date of patient verified. AVS was printed and given to the patient. All instructions on the AVS was explained to the patient. Time was made for questions and answers. Patient given directions to near by imaging centers to have knee x-rays done. Patient made aware that they will need to apply for financial assistance/ Care Card after receiving the bill from imaging.

## 2023-09-13 ENCOUNTER — HOSPITAL ENCOUNTER (OUTPATIENT)
Facility: HOSPITAL | Age: 38
Setting detail: SPECIMEN
Discharge: HOME | End: 2023-09-16

## 2023-09-13 LAB
ALBUMIN SERPL-MCNC: 4 G/DL (ref 3.5–5)
ALBUMIN/GLOB SERPL: 1.1 (ref 1.1–2.2)
ALP SERPL-CCNC: 80 U/L (ref 45–117)
ALT SERPL-CCNC: 40 U/L (ref 12–78)
ANION GAP SERPL CALC-SCNC: 1 MMOL/L (ref 5–15)
AST SERPL-CCNC: 19 U/L (ref 15–37)
BILIRUB SERPL-MCNC: 0.7 MG/DL (ref 0.2–1)
BUN SERPL-MCNC: 10 MG/DL (ref 6–20)
BUN/CREAT SERPL: 12 (ref 12–20)
CALCIUM SERPL-MCNC: 9.3 MG/DL (ref 8.5–10.1)
CHLORIDE SERPL-SCNC: 106 MMOL/L (ref 97–108)
CHOLEST SERPL-MCNC: 164 MG/DL
CO2 SERPL-SCNC: 31 MMOL/L (ref 21–32)
CREAT SERPL-MCNC: 0.86 MG/DL (ref 0.7–1.3)
GLOBULIN SER CALC-MCNC: 3.5 G/DL (ref 2–4)
GLUCOSE SERPL-MCNC: 99 MG/DL (ref 65–100)
HDLC SERPL-MCNC: 43 MG/DL
HDLC SERPL: 3.8 (ref 0–5)
LDLC SERPL CALC-MCNC: 102.4 MG/DL (ref 0–100)
POTASSIUM SERPL-SCNC: 4.1 MMOL/L (ref 3.5–5.1)
PROT SERPL-MCNC: 7.5 G/DL (ref 6.4–8.2)
SODIUM SERPL-SCNC: 138 MMOL/L (ref 136–145)
TRIGL SERPL-MCNC: 93 MG/DL
VLDLC SERPL CALC-MCNC: 18.6 MG/DL

## 2023-09-13 PROCEDURE — 80053 COMPREHEN METABOLIC PANEL: CPT

## 2023-09-13 PROCEDURE — 36415 COLL VENOUS BLD VENIPUNCTURE: CPT

## 2023-09-13 PROCEDURE — 80061 LIPID PANEL: CPT

## 2023-09-13 PROCEDURE — 83036 HEMOGLOBIN GLYCOSYLATED A1C: CPT

## 2023-09-14 LAB
EST. AVERAGE GLUCOSE BLD GHB EST-MCNC: 114 MG/DL
HBA1C MFR BLD: 5.6 % (ref 4–5.6)

## 2023-10-09 ENCOUNTER — OFFICE VISIT (OUTPATIENT)
Age: 38
End: 2023-10-09

## 2023-10-09 VITALS
TEMPERATURE: 98 F | BODY MASS INDEX: 49.44 KG/M2 | WEIGHT: 315 LBS | DIASTOLIC BLOOD PRESSURE: 99 MMHG | SYSTOLIC BLOOD PRESSURE: 146 MMHG | HEIGHT: 67 IN | OXYGEN SATURATION: 97 % | HEART RATE: 67 BPM

## 2023-10-09 DIAGNOSIS — R03.0 ELEVATED BLOOD-PRESSURE READING WITHOUT DIAGNOSIS OF HYPERTENSION: Primary | ICD-10-CM

## 2023-10-09 PROCEDURE — 99213 OFFICE O/P EST LOW 20 MIN: CPT | Performed by: FAMILY MEDICINE

## 2023-10-09 SDOH — ECONOMIC STABILITY: FOOD INSECURITY: WITHIN THE PAST 12 MONTHS, YOU WORRIED THAT YOUR FOOD WOULD RUN OUT BEFORE YOU GOT MONEY TO BUY MORE.: SOMETIMES TRUE

## 2023-10-09 SDOH — ECONOMIC STABILITY: INCOME INSECURITY: HOW HARD IS IT FOR YOU TO PAY FOR THE VERY BASICS LIKE FOOD, HOUSING, MEDICAL CARE, AND HEATING?: NOT HARD AT ALL

## 2023-10-09 SDOH — ECONOMIC STABILITY: FOOD INSECURITY: WITHIN THE PAST 12 MONTHS, THE FOOD YOU BOUGHT JUST DIDN'T LAST AND YOU DIDN'T HAVE MONEY TO GET MORE.: SOMETIMES TRUE

## 2023-10-09 ASSESSMENT — PATIENT HEALTH QUESTIONNAIRE - PHQ9
SUM OF ALL RESPONSES TO PHQ9 QUESTIONS 1 & 2: 0
1. LITTLE INTEREST OR PLEASURE IN DOING THINGS: 0
2. FEELING DOWN, DEPRESSED OR HOPELESS: 0
SUM OF ALL RESPONSES TO PHQ QUESTIONS 1-9: 0

## 2023-10-09 ASSESSMENT — ENCOUNTER SYMPTOMS
GASTROINTESTINAL NEGATIVE: 1
RESPIRATORY NEGATIVE: 1

## 2023-10-09 NOTE — PROGRESS NOTES
Coordination of Care  1. Have you been to the ER, urgent care clinic since your last visit? NO   Hospitalized since your last visit? NO    2. Have you seen or consulted any other health care providers outside of the [address] [place] [address] since your last visit? NO  Include any pap smears or colon screening. NO    Does the patient need refills? N/A    Learning Assessment Complete? YES  Depression Screening complete in the past 12 months?  YES

## 2023-10-09 NOTE — PROGRESS NOTES
Name and  confirmed w/ patient. An After Visit Summary was provided and all discharge instructions were reviewed with the patient including: f/up appt and chronic illness education and materials. Time for questions and answers provided, patient verbalized understanding. Patient discharged from clinic in stable condition.

## 2023-11-22 ENCOUNTER — OFFICE VISIT (OUTPATIENT)
Age: 38
End: 2023-11-22

## 2023-11-22 VITALS
WEIGHT: 315 LBS | BODY MASS INDEX: 55.88 KG/M2 | HEART RATE: 69 BPM | SYSTOLIC BLOOD PRESSURE: 123 MMHG | TEMPERATURE: 97.8 F | DIASTOLIC BLOOD PRESSURE: 79 MMHG | OXYGEN SATURATION: 96 %

## 2023-11-22 DIAGNOSIS — E66.01 OBESITY, MORBID (HCC): ICD-10-CM

## 2023-11-22 DIAGNOSIS — R03.0 ELEVATED BLOOD-PRESSURE READING WITHOUT DIAGNOSIS OF HYPERTENSION: Primary | ICD-10-CM

## 2023-11-22 PROCEDURE — 99213 OFFICE O/P EST LOW 20 MIN: CPT | Performed by: FAMILY MEDICINE

## 2023-11-22 SDOH — ECONOMIC STABILITY: FOOD INSECURITY: WITHIN THE PAST 12 MONTHS, THE FOOD YOU BOUGHT JUST DIDN'T LAST AND YOU DIDN'T HAVE MONEY TO GET MORE.: NEVER TRUE

## 2023-11-22 SDOH — ECONOMIC STABILITY: INCOME INSECURITY: HOW HARD IS IT FOR YOU TO PAY FOR THE VERY BASICS LIKE FOOD, HOUSING, MEDICAL CARE, AND HEATING?: SOMEWHAT HARD

## 2023-11-22 SDOH — ECONOMIC STABILITY: FOOD INSECURITY: WITHIN THE PAST 12 MONTHS, YOU WORRIED THAT YOUR FOOD WOULD RUN OUT BEFORE YOU GOT MONEY TO BUY MORE.: NEVER TRUE

## 2023-11-22 ASSESSMENT — PATIENT HEALTH QUESTIONNAIRE - PHQ9
SUM OF ALL RESPONSES TO PHQ QUESTIONS 1-9: 0
SUM OF ALL RESPONSES TO PHQ9 QUESTIONS 1 & 2: 0
SUM OF ALL RESPONSES TO PHQ QUESTIONS 1-9: 0
1. LITTLE INTEREST OR PLEASURE IN DOING THINGS: 0
2. FEELING DOWN, DEPRESSED OR HOPELESS: 0

## 2023-11-22 ASSESSMENT — ENCOUNTER SYMPTOMS
GASTROINTESTINAL NEGATIVE: 1
RESPIRATORY NEGATIVE: 1

## 2023-11-22 NOTE — PROGRESS NOTES
Coordination of Care  1. Have you been to the ER, urgent care clinic since your last visit? Hospitalized since your last visit? no    2. Have you seen or consulted any other health care providers outside of the [address] [place] [address] since your last visit? Include any pap smears or colon screening. no    Does the patient need refills? yes    Learning Assessment Complete?  yes  Depression Screening complete in the past 12 months? yes

## 2023-11-22 NOTE — PROGRESS NOTES
Clement Curiel seen at discharge. Full name and  verified; After visit Summary was given. RN reviewed today's visit with patient, as well as instructions on when it is recommended to return for follow-up visit in 6 months. RN reviewed the provider's instructions with the patient, answering all questions to his satisfaction. Patient verbalized understanding.   Arden Seals RN

## 2024-01-29 ENCOUNTER — OFFICE VISIT (OUTPATIENT)
Age: 39
End: 2024-01-29

## 2024-01-29 DIAGNOSIS — Z71.89 COUNSELING AND COORDINATION OF CARE: Primary | ICD-10-CM

## 2024-01-29 PROCEDURE — 99080 SPECIAL REPORTS OR FORMS: CPT | Performed by: PHYSICIAN ASSISTANT

## 2024-01-29 SDOH — ECONOMIC STABILITY: HOUSING INSECURITY: IN THE LAST 12 MONTHS, HOW MANY PLACES HAVE YOU LIVED?: 1

## 2024-01-29 SDOH — ECONOMIC STABILITY: INCOME INSECURITY: IN THE LAST 12 MONTHS, WAS THERE A TIME WHEN YOU WERE NOT ABLE TO PAY THE MORTGAGE OR RENT ON TIME?: NO

## 2024-01-29 SDOH — ECONOMIC STABILITY: TRANSPORTATION INSECURITY
IN THE PAST 12 MONTHS, HAS LACK OF TRANSPORTATION KEPT YOU FROM MEETINGS, WORK, OR FROM GETTING THINGS NEEDED FOR DAILY LIVING?: NO

## 2024-01-29 SDOH — ECONOMIC STABILITY: TRANSPORTATION INSECURITY
IN THE PAST 12 MONTHS, HAS THE LACK OF TRANSPORTATION KEPT YOU FROM MEDICAL APPOINTMENTS OR FROM GETTING MEDICATIONS?: NO

## 2024-01-29 NOTE — PROGRESS NOTES
CHW assisted patient with medical bills he's receiving after a [facility] visit.  CHW explained that it ts a billing error and patient does not need to pay for those bills. Patient verbalized understanding.   Patient's bill will be sent to supervisor.

## 2024-02-23 ENCOUNTER — OFFICE VISIT (OUTPATIENT)
Age: 39
End: 2024-02-23

## 2024-02-23 VITALS
HEART RATE: 82 BPM | BODY MASS INDEX: 55.97 KG/M2 | OXYGEN SATURATION: 98 % | WEIGHT: 315 LBS | TEMPERATURE: 97.9 F | SYSTOLIC BLOOD PRESSURE: 136 MMHG | DIASTOLIC BLOOD PRESSURE: 83 MMHG

## 2024-02-23 DIAGNOSIS — Z76.89 ENCOUNTER FOR ACUPUNCTURE VISIT: Primary | ICD-10-CM

## 2024-02-23 DIAGNOSIS — M54.9 UPPER BACK PAIN: ICD-10-CM

## 2024-02-23 ASSESSMENT — PATIENT HEALTH QUESTIONNAIRE - PHQ9
1. LITTLE INTEREST OR PLEASURE IN DOING THINGS: 0
SUM OF ALL RESPONSES TO PHQ QUESTIONS 1-9: 0
SUM OF ALL RESPONSES TO PHQ9 QUESTIONS 1 & 2: 0
2. FEELING DOWN, DEPRESSED OR HOPELESS: 0
SUM OF ALL RESPONSES TO PHQ QUESTIONS 1-9: 0

## 2024-02-23 NOTE — PROGRESS NOTES
Patient name and date of birth verified.   Patient given an after visit summary.  Patient instructions are to rest and hydrate.  Patient verbalized understanding of all information given at time of visit. Kellie Tamez LPN

## 2024-02-23 NOTE — PROGRESS NOTES
ACUPUNCTURE VISIT    Britton Curiel is a 38 y.o. male.    Acupuncture visit # 1  Self referred for  Back pain - upper x 6 months.  Comes and goes.  Sometimes wakes up with the pain and it improves during the day.  No meds for the pain  No n/t  Sometimes has neck pain at night.    No history of syncope. Does not have a pacemaker. No bleeding disorder.    Symptoms to be addressed today: upper back pain    ROS - see above; otherwise normal.    PHYSICAL EXAM -  Alert and Oriented x3, NAD  Normal respirations  Normal pulses  TTP thoracic bilaterally.     PROCEDURE NOTE-  Informed consent obtained.  Time out performed prior to treatment.  Ah Alena points on outer bladder line clustered and connected @15 hertz.  GB25, GV20, SI15, BL10  Gua Sha thoracic.    1. Encounter for acupuncture visit  - AL ACUPUNCTURE 1/> NDLS W/ELEC STIMJ 1ST 15 MIN    2. Upper back pain    Return in 4-6 weeks for additional treatments.

## 2024-02-23 NOTE — PROGRESS NOTES
\"Have you been to the ER, urgent care clinic since your last visit?  Hospitalized since your last visit?\"    NO    “Have you seen or consulted any other health care providers outside of [facility] since your last visit?”    NO

## 2025-08-20 ENCOUNTER — HOSPITAL ENCOUNTER (OUTPATIENT)
Facility: HOSPITAL | Age: 40
Setting detail: SPECIMEN
Discharge: HOME | End: 2025-08-23

## 2025-08-20 ENCOUNTER — OFFICE VISIT (OUTPATIENT)
Age: 40
End: 2025-08-20

## 2025-08-20 VITALS
DIASTOLIC BLOOD PRESSURE: 76 MMHG | WEIGHT: 315 LBS | HEART RATE: 73 BPM | SYSTOLIC BLOOD PRESSURE: 129 MMHG | BODY MASS INDEX: 57.76 KG/M2 | OXYGEN SATURATION: 96 % | TEMPERATURE: 98.3 F

## 2025-08-20 DIAGNOSIS — E66.01 OBESITY, MORBID (HCC): ICD-10-CM

## 2025-08-20 DIAGNOSIS — Z00.00 GENERAL MEDICAL EXAM: Primary | ICD-10-CM

## 2025-08-20 DIAGNOSIS — Z00.00 GENERAL MEDICAL EXAM: ICD-10-CM

## 2025-08-20 DIAGNOSIS — D17.22 LIPOMA OF LEFT SHOULDER: ICD-10-CM

## 2025-08-20 DIAGNOSIS — Z71.89 COUNSELING AND COORDINATION OF CARE: Primary | ICD-10-CM

## 2025-08-20 DIAGNOSIS — Z23 ENCOUNTER FOR IMMUNIZATION: ICD-10-CM

## 2025-08-20 DIAGNOSIS — R20.8 BURNING SENSATION OF FEET: ICD-10-CM

## 2025-08-20 PROCEDURE — 90715 TDAP VACCINE 7 YRS/> IM: CPT | Performed by: FAMILY MEDICINE

## 2025-08-20 PROCEDURE — 85025 COMPLETE CBC W/AUTO DIFF WBC: CPT

## 2025-08-20 PROCEDURE — 84443 ASSAY THYROID STIM HORMONE: CPT

## 2025-08-20 PROCEDURE — 82607 VITAMIN B-12: CPT

## 2025-08-20 PROCEDURE — 99396 PREV VISIT EST AGE 40-64: CPT | Performed by: FAMILY MEDICINE

## 2025-08-20 PROCEDURE — 84439 ASSAY OF FREE THYROXINE: CPT

## 2025-08-20 PROCEDURE — 90471 IMMUNIZATION ADMIN: CPT | Performed by: FAMILY MEDICINE

## 2025-08-20 PROCEDURE — 80053 COMPREHEN METABOLIC PANEL: CPT

## 2025-08-20 PROCEDURE — 36415 COLL VENOUS BLD VENIPUNCTURE: CPT

## 2025-08-20 PROCEDURE — 80061 LIPID PANEL: CPT

## 2025-08-20 PROCEDURE — 83036 HEMOGLOBIN GLYCOSYLATED A1C: CPT

## 2025-08-20 PROCEDURE — 82746 ASSAY OF FOLIC ACID SERUM: CPT

## 2025-08-20 SDOH — ECONOMIC STABILITY: FOOD INSECURITY: WITHIN THE PAST 12 MONTHS, THE FOOD YOU BOUGHT JUST DIDN'T LAST AND YOU DIDN'T HAVE MONEY TO GET MORE.: NEVER TRUE

## 2025-08-20 SDOH — ECONOMIC STABILITY: FOOD INSECURITY: WITHIN THE PAST 12 MONTHS, YOU WORRIED THAT YOUR FOOD WOULD RUN OUT BEFORE YOU GOT MONEY TO BUY MORE.: NEVER TRUE

## 2025-08-20 ASSESSMENT — PATIENT HEALTH QUESTIONNAIRE - PHQ9
2. FEELING DOWN, DEPRESSED OR HOPELESS: NOT AT ALL
SUM OF ALL RESPONSES TO PHQ QUESTIONS 1-9: 0
SUM OF ALL RESPONSES TO PHQ QUESTIONS 1-9: 0
1. LITTLE INTEREST OR PLEASURE IN DOING THINGS: NOT AT ALL
SUM OF ALL RESPONSES TO PHQ QUESTIONS 1-9: 0
SUM OF ALL RESPONSES TO PHQ QUESTIONS 1-9: 0

## 2025-08-21 LAB
ALBUMIN SERPL-MCNC: 3.9 G/DL (ref 3.5–5.2)
ALBUMIN/GLOB SERPL: 1.2 (ref 1.1–2.2)
ALP SERPL-CCNC: 84 U/L (ref 40–129)
ALT SERPL-CCNC: 36 U/L (ref 10–50)
ANION GAP SERPL CALC-SCNC: 12 MMOL/L (ref 2–14)
AST SERPL-CCNC: 26 U/L (ref 10–50)
BASOPHILS # BLD: 0.1 K/UL (ref 0–0.1)
BASOPHILS NFR BLD: 0.8 % (ref 0–1)
BILIRUB SERPL-MCNC: 0.6 MG/DL (ref 0–1.2)
BUN SERPL-MCNC: 11 MG/DL (ref 6–20)
BUN/CREAT SERPL: 14 (ref 12–20)
CALCIUM SERPL-MCNC: 9.4 MG/DL (ref 8.6–10)
CHLORIDE SERPL-SCNC: 102 MMOL/L (ref 98–107)
CHOLEST SERPL-MCNC: 166 MG/DL (ref 0–200)
CO2 SERPL-SCNC: 26 MMOL/L (ref 20–29)
CREAT SERPL-MCNC: 0.8 MG/DL (ref 0.7–1.2)
DIFFERENTIAL METHOD BLD: ABNORMAL
EOSINOPHIL # BLD: 0.29 K/UL (ref 0–0.4)
EOSINOPHIL NFR BLD: 2.4 % (ref 0–7)
ERYTHROCYTE [DISTWIDTH] IN BLOOD BY AUTOMATED COUNT: 14.8 % (ref 11.5–14.5)
EST. AVERAGE GLUCOSE BLD GHB EST-MCNC: 126 MG/DL
FOLATE SERPL-MCNC: 10.5 NG/ML (ref 7–140)
GLOBULIN SER CALC-MCNC: 3.3 G/DL (ref 2–4)
GLUCOSE SERPL-MCNC: 85 MG/DL (ref 65–100)
HBA1C MFR BLD: 6 % (ref 4–5.6)
HCT VFR BLD AUTO: 48 % (ref 36.6–50.3)
HDLC SERPL-MCNC: 41 MG/DL (ref 40–60)
HDLC SERPL: 4 (ref 0–5)
HGB BLD-MCNC: 15.3 G/DL (ref 12.1–17)
IMM GRANULOCYTES # BLD AUTO: 0.06 K/UL (ref 0–0.04)
IMM GRANULOCYTES NFR BLD AUTO: 0.5 % (ref 0–0.5)
LDLC SERPL CALC-MCNC: 103 MG/DL (ref 0–100)
LYMPHOCYTES # BLD: 3.04 K/UL (ref 0.8–3.5)
LYMPHOCYTES NFR BLD: 25.3 % (ref 12–49)
MCH RBC QN AUTO: 25.6 PG (ref 26–34)
MCHC RBC AUTO-ENTMCNC: 31.9 G/DL (ref 30–36.5)
MCV RBC AUTO: 80.4 FL (ref 80–99)
MONOCYTES # BLD: 0.74 K/UL (ref 0–1)
MONOCYTES NFR BLD: 6.2 % (ref 5–13)
NEUTS SEG # BLD: 7.78 K/UL (ref 1.8–8)
NEUTS SEG NFR BLD: 64.8 % (ref 32–75)
NRBC # BLD: 0 K/UL (ref 0–0.01)
NRBC BLD-RTO: 0 PER 100 WBC
PLATELET # BLD AUTO: 313 K/UL (ref 150–400)
POTASSIUM SERPL-SCNC: 4.5 MMOL/L (ref 3.5–5.1)
PROT SERPL-MCNC: 7.2 G/DL (ref 6.4–8.3)
RBC # BLD AUTO: 5.97 M/UL (ref 4.1–5.7)
RBC MORPH BLD: ABNORMAL
SODIUM SERPL-SCNC: 140 MMOL/L (ref 136–145)
T4 FREE SERPL-MCNC: 1 NG/DL (ref 0.9–1.6)
TRIGL SERPL-MCNC: 108 MG/DL (ref 0–150)
TSH, 3RD GENERATION: 2.49 UIU/ML (ref 0.27–4.2)
VIT B12 SERPL-MCNC: 769 PG/ML (ref 232–1245)
VLDLC SERPL CALC-MCNC: 22 MG/DL
WBC # BLD AUTO: 12 K/UL (ref 4.1–11.1)